# Patient Record
Sex: FEMALE | Race: AMERICAN INDIAN OR ALASKA NATIVE
[De-identification: names, ages, dates, MRNs, and addresses within clinical notes are randomized per-mention and may not be internally consistent; named-entity substitution may affect disease eponyms.]

---

## 2019-02-07 ENCOUNTER — HOSPITAL ENCOUNTER (INPATIENT)
Dept: HOSPITAL 5 - TRG | Age: 29
LOS: 2 days | Discharge: HOME | End: 2019-02-09
Attending: OBSTETRICS & GYNECOLOGY | Admitting: OBSTETRICS & GYNECOLOGY
Payer: MEDICAID

## 2019-02-07 DIAGNOSIS — Z23: ICD-10-CM

## 2019-02-07 DIAGNOSIS — Z79.899: ICD-10-CM

## 2019-02-07 DIAGNOSIS — Z3A.40: ICD-10-CM

## 2019-02-07 DIAGNOSIS — Z91.013: ICD-10-CM

## 2019-02-07 LAB
BASOPHILS # (AUTO): 0 K/MM3 (ref 0–0.1)
BASOPHILS NFR BLD AUTO: 0.2 % (ref 0–1.8)
EOSINOPHIL # BLD AUTO: 0 K/MM3 (ref 0–0.4)
EOSINOPHIL NFR BLD AUTO: 0.1 % (ref 0–4.3)
HCT VFR BLD CALC: 39.2 % (ref 30.3–42.9)
HGB BLD-MCNC: 13.1 GM/DL (ref 10.1–14.3)
LYMPHOCYTES # BLD AUTO: 1.5 K/MM3 (ref 1.2–5.4)
LYMPHOCYTES NFR BLD AUTO: 14.8 % (ref 13.4–35)
MCHC RBC AUTO-ENTMCNC: 33 % (ref 30–34)
MCV RBC AUTO: 109 FL (ref 79–97)
MONOCYTES # (AUTO): 0.5 K/MM3 (ref 0–0.8)
MONOCYTES % (AUTO): 4.6 % (ref 0–7.3)
PLATELET # BLD: 105 K/MM3 (ref 140–440)
RBC # BLD AUTO: 3.6 M/MM3 (ref 3.65–5.03)

## 2019-02-07 PROCEDURE — 85014 HEMATOCRIT: CPT

## 2019-02-07 PROCEDURE — 85018 HEMOGLOBIN: CPT

## 2019-02-07 PROCEDURE — 86901 BLOOD TYPING SEROLOGIC RH(D): CPT

## 2019-02-07 PROCEDURE — 59025 FETAL NON-STRESS TEST: CPT

## 2019-02-07 PROCEDURE — 86900 BLOOD TYPING SEROLOGIC ABO: CPT

## 2019-02-07 PROCEDURE — 36415 COLL VENOUS BLD VENIPUNCTURE: CPT

## 2019-02-07 PROCEDURE — 85025 COMPLETE CBC W/AUTO DIFF WBC: CPT

## 2019-02-07 PROCEDURE — 86592 SYPHILIS TEST NON-TREP QUAL: CPT

## 2019-02-07 PROCEDURE — 86850 RBC ANTIBODY SCREEN: CPT

## 2019-02-07 PROCEDURE — 90707 MMR VACCINE SC: CPT

## 2019-02-07 PROCEDURE — 85461 HEMOGLOBIN FETAL: CPT

## 2019-02-07 RX ADMIN — SODIUM CHLORIDE, SODIUM LACTATE, POTASSIUM CHLORIDE, AND CALCIUM CHLORIDE SCH MLS/HR: .6; .31; .03; .02 INJECTION, SOLUTION INTRAVENOUS at 20:15

## 2019-02-07 RX ADMIN — FENTANYL CITRATE SCH MLS/HR: 50 INJECTION, SOLUTION INTRAMUSCULAR; INTRAVENOUS at 21:29

## 2019-02-08 LAB
HCT VFR BLD CALC: 31.6 % (ref 30.3–42.9)
HGB BLD-MCNC: 10.4 GM/DL (ref 10.1–14.3)

## 2019-02-08 PROCEDURE — 3E0334Z INTRODUCTION OF SERUM, TOXOID AND VACCINE INTO PERIPHERAL VEIN, PERCUTANEOUS APPROACH: ICD-10-PCS | Performed by: OBSTETRICS & GYNECOLOGY

## 2019-02-08 PROCEDURE — 0KQM0ZZ REPAIR PERINEUM MUSCLE, OPEN APPROACH: ICD-10-PCS | Performed by: OBSTETRICS & GYNECOLOGY

## 2019-02-08 RX ADMIN — FENTANYL CITRATE SCH MLS/HR: 50 INJECTION, SOLUTION INTRAMUSCULAR; INTRAVENOUS at 04:40

## 2019-02-08 RX ADMIN — SODIUM CHLORIDE, SODIUM LACTATE, POTASSIUM CHLORIDE, AND CALCIUM CHLORIDE SCH MLS/HR: .6; .31; .03; .02 INJECTION, SOLUTION INTRAVENOUS at 01:14

## 2019-02-08 RX ADMIN — Medication SCH EACH: at 12:10

## 2019-02-08 RX ADMIN — IBUPROFEN SCH MG: 600 TABLET, FILM COATED ORAL at 17:39

## 2019-02-08 RX ADMIN — FERROUS SULFATE TAB 325 MG (65 MG ELEMENTAL FE) SCH MG: 325 (65 FE) TAB at 12:10

## 2019-02-08 RX ADMIN — FERROUS SULFATE TAB 325 MG (65 MG ELEMENTAL FE) SCH MG: 325 (65 FE) TAB at 22:12

## 2019-02-08 RX ADMIN — IBUPROFEN SCH MG: 600 TABLET, FILM COATED ORAL at 12:11

## 2019-02-08 RX ADMIN — DOCUSATE SODIUM SCH MG: 100 CAPSULE, LIQUID FILLED ORAL at 22:12

## 2019-02-08 NOTE — HISTORY AND PHYSICAL REPORT
History of Present Illness


Date of examination: 19


Date of admission: 


19 19:03





Chief complaint: 





contractions


History of present illness: 





This is a 29 yo  at 40+3 weeks here for contractions. She was evaluated 

in triage and noted to be 7cm. She is apatient of [facility] with late presentation

to care at 16 weeks. She is rh neg and received rhogam 18. She has a hx of

std in this pregnancy including chlamydia treated and mary neg. Also trich tr

eated in September and neg in October. She is GBS neg. 





Past History


Past Medical History: heart disease (heart mumur )


Past Surgical History: no surgical history


GYN History: chlamydia, trichomonas


Social history: single.  denies: smoking, alcohol abuse, prescription drug abuse





- Obstetrical History


Expected Date of Delivery: 19


Actual Gestation: 40 Week(s) 3 Day(s) 


: 2


Para: 0


Hx # Term Pregnancies: 0


Number of  Pregnancies: 0


Spontaneous Abortions: 0


Induced : 1


Number of Living Children: 0





Medications and Allergies


                                    Allergies











Allergy/AdvReac Type Severity Reaction Status Date / Time


 


SEAFOOD Allergy Intermediate Swelling Uncoded 19 19:12











                                Home Medications











 Medication  Instructions  Recorded  Confirmed  Last Taken  Type


 


Prenatal Vit No.129/Iron/Folic 1 each PO DAILY 19 09:00 

History





[Prenatal Tablet]    1 











Active Meds: 


Active Medications





Butorphanol Tartrate (Stadol)  2 mg IV Q2H PRN


   PRN Reason: Labor Pain


Ephedrine Sulfate (Ephedrine Sulfate)  10 mg IV Q2M PRN


   PRN Reason: Hypotension


Lactated Ringer's (Lactated Ringers)  1,000 mls @ 125 mls/hr IV AS DIRECT MATTHEW


   Last Admin: 19 20:15 Dose:  125 mls/hr


   Documented by: 


Fentanyl/Bupivacaine/Sodium Chlor (Fentanyl-Bupiv 2 Mcg/Ml-0.125%)  200 mcg in 

100 mls @ 12 mls/hr EPIDURAL TITR MATTHEW; Protocol


   Last Admin: 19 21:29 Dose:  12 mls/hr


   Documented by: 


Naloxone HCl (Narcan 2 Mg/2 Ml)  0.2 mg IV Q5M PRN


   PRN Reason: Respiratory sedation











Review of Systems


All systems: negative





- Vital Signs


Vital signs: 


                                   Vital Signs











Pulse BP


 


 76   129/73 


 


 19 18:34  19 18:34








                                        











Temp Pulse Resp BP Pulse Ox


 


 98.6 F   146 H  18   122/64   96 


 


 19 19:00  19 00:43  19 21:34  19 00:24  19 00:43














- Physical Exam


Breasts: Positive: normal


Cardiovascular: Regular rate, Normal S1


Lungs: Positive: Clear to auscultation, Normal air movement


Abdomen: Positive: normal appearance, soft, normal bowel sounds.  Negative: 

distention, tenderness, guarding


Genitourinary (Female): Positive: normal external genitalia, normal perenium


Vagina: Positive: normal moisture


Uterus: Positive: normal size, normal contour


Anus/Rectum: Positive: normal perianal skin


Extremities: Positive: normal


Deep Tendon Reflex Grade: Normal +2





- Obstetrical


FHR: category 1


Cervical Dilatation: 10


Cervical Effacement Percentage: 100


Fetal station: -2


Uterine Contraction Pattern: Regular


Uterine Tone Measurement Phase: Contraction


Uterine Contraction Intensity: Strong/Firm





Results


Result Diagrams: 


                                 19 16:50





                              Abnormal lab results











  19 Range/Units





  16:50 


 


RBC  3.60 L  (3.65-5.03)  M/mm3


 


MCV  109 H  (79-97)  fl


 


MCH  36 H  (28-32)  pg


 


Plt Count  105 L  (140-440)  K/mm3


 


Seg Neutrophils %  80.3 H  (40.0-70.0)  %


 


Seg Neutrophils #  8.1 H  (1.8-7.7)  K/mm3








All other labs normal.








Assessment and Plan





A/P 


IUP 40+3 weeks


GBS neg


Active labor


s/p epidural 


start pitocin for augmentation


expect vaginal delivery

## 2019-02-08 NOTE — PROCEDURE NOTE
OB Delivery Note





- Delivery


Date of Delivery: 19


Surgeon: HANS HILTON


Estimated blood loss: other (450cc)





- Vaginal


Delivery presentation: vertex


Delivery position: OP


Delivery augmentation: rupture of membranes


Delivery monitor: external FHT, external uterine


Route of delivery: 


Delivery placenta: spontaneous


Delivery cord: 3 umbilical vessels


Episiotomy: none


Delivery laceration: 2nd degree


Delivery repair: vicryl


Delivery comments: 





Patient was noted to be pushing and  in OP presentation and delivered a 

male infant with head and shoulders delivered easily at 0516. The baby placed on

mom abdomen and suctioned nasopharynx and oropharynx while on mom chest. The 

cord was allowed to pulsate for 1 min. Apgars noted to be 8 and 9. The cord was 

then clamped and cut. The placenta delivered  intact 3 vessel cord. weight of 

the baby is 7 pounds 7 oz. EBL 450cc. Survey of perineum noted a 2nd degree 

repairedd with 2-0 vicryl in normal fashion. Patinient tolerated and bonded with

baby 





- Infant


  ** A


Apgar at 1 minute: 8


Apgar at 5 minutes: 9


Infant Gender: Male (7 pounds 7 oz)

## 2019-02-09 VITALS — DIASTOLIC BLOOD PRESSURE: 68 MMHG | SYSTOLIC BLOOD PRESSURE: 107 MMHG

## 2019-02-09 PROCEDURE — 3E0234Z INTRODUCTION OF SERUM, TOXOID AND VACCINE INTO MUSCLE, PERCUTANEOUS APPROACH: ICD-10-PCS | Performed by: OBSTETRICS & GYNECOLOGY

## 2019-02-09 RX ADMIN — DOCUSATE SODIUM SCH MG: 100 CAPSULE, LIQUID FILLED ORAL at 10:44

## 2019-02-09 RX ADMIN — IBUPROFEN SCH MG: 600 TABLET, FILM COATED ORAL at 13:17

## 2019-02-09 RX ADMIN — Medication SCH EACH: at 10:44

## 2019-02-09 RX ADMIN — FERROUS SULFATE TAB 325 MG (65 MG ELEMENTAL FE) SCH MG: 325 (65 FE) TAB at 10:44

## 2019-02-09 RX ADMIN — IBUPROFEN SCH: 600 TABLET, FILM COATED ORAL at 00:00

## 2019-02-09 NOTE — DISCHARGE SUMMARY
Providers





- Providers


Date of Admission: 


19 19:03





Date of discharge: 19


Attending physician: 


HANS HILTON MD





Primary care physician: 


TSERING CARROLL








Hospitalization


Reason for admission: active labor


Delivery: 


Discharge diagnosis: IUP at term delivered


Hospital course: 


Patient admitted in active labor. Had a . Postpartum uneventful





Condition at discharge: Good


Disposition: DC-01 TO HOME OR SELFCARE





- Discharge Diagnoses


(1) Active labor at term


Status: Acute   





Plan





- Discharge Medications


Prescriptions: 


Ibuprofen [Motrin] 600 mg PO Q8H PRN #30 tablet


 PRN Reason: Pain


oxyCODONE /ACETAMINOPHEN [Percocet 5/325] 1 tab PO Q6HR PRN #30 tablet


 PRN Reason: Pain





- Provider Discharge Summary


Activity: no sex for 6 weeks, no heavy lifting 4 weeks, no strenuous exercise


Diet: routine


Instructions: routine


Additional instructions: 


[]  Smoking cessation referral if applicable(refer to patient education folder 

for contact #)


[]  Refer to [facility]'s [facility] Booklet








Call your doctor immediately for:


* Fever > 100.5


* Heavy vaginal bleeding ( >1 pad per hour)


* Severe persistent headache


* Shortness of breath


* Reddened, hot, painful area to leg or breast


* schedule followup in 4 weeks








- Follow up plan

## 2019-02-09 NOTE — PROGRESS NOTE
Assessment and Plan





- Patient Problems


(1) Active labor at term


Current Visit: Yes   Status: Acute   


Plan to address problem: 


patient doing well 


discharge home








Subjective





- Subjective


Date of service: 19


Interval history: 


Patient doing well without complaints. Pain well controlled





Patient reports: appetite normal, voiding normally, pain well controlled


: doing well





Objective





- Vital Signs


Latest vital signs: 


                                   Vital Signs











  Temp Pulse Resp BP BP Pulse Ox


 


 19 08:19  97.7 F  78  20  108/69   100


 


 19 00:25  98.3 F   18  113/59  


 


 19 20:02  97.3 F L   18  94/47  


 


 19 20:00      110/60 


 


 19 17:06  97.5 F L   18  110/62  








                                Intake and Output











 19





 22:59 06:59 14:59


 


Intake Total  480 


 


Balance  480 


 


Intake:   


 


  Oral  480 


 


Other:   


 


  Total, Intake Amount  480 


 


  # Voids   


 


    Void  1 














- Exam


Breasts: Present: deferred


Cardiovascular: Present: Regular rate


Abdomen: Present: normal appearance

## 2022-05-30 ENCOUNTER — HOSPITAL ENCOUNTER (EMERGENCY)
Dept: HOSPITAL 5 - ED | Age: 32
LOS: 1 days | Discharge: HOME | End: 2022-05-31
Payer: MEDICAID

## 2022-05-30 DIAGNOSIS — M23.92: Primary | ICD-10-CM

## 2022-05-30 PROCEDURE — 81025 URINE PREGNANCY TEST: CPT

## 2022-05-30 PROCEDURE — 99283 EMERGENCY DEPT VISIT LOW MDM: CPT

## 2022-05-31 VITALS — SYSTOLIC BLOOD PRESSURE: 111 MMHG | DIASTOLIC BLOOD PRESSURE: 63 MMHG

## 2022-05-31 NOTE — EMERGENCY DEPARTMENT REPORT
ED Lower Extremity HPI





- General


Chief Complaint: Fall


Stated Complaint: FALL INJURY


Time Seen by Provider: 05/31/22 03:14


Source: patient


Mode of arrival: Ambulatory


Limitations: No Limitations





- History of Present Illness


MD Complaint: knee injury


-: Sudden


Injury: Knee: Left





- Related Data


                                Home Medications











 Medication  Instructions  Recorded  Confirmed  Last Taken


 


Prenatal Vit No.129/Iron/Folic 1 each PO DAILY 02/07/19 02/07/19 02/07/19 09:00





[Prenatal Tablet]    1








                                  Previous Rx's











 Medication  Instructions  Recorded  Last Taken  Type


 


Ibuprofen [Motrin] 600 mg PO Q8H PRN #30 tablet 02/08/19 Unknown Rx


 


oxyCODONE /ACETAMINOPHEN [Percocet 1 tab PO Q6HR PRN #30 tablet 02/08/19 Unknown

 Rx





5/325]    


 


traMADoL [Ultram] 50 mg PO Q6HR PRN #20 tablet 05/31/22 Unknown Rx











                                    Allergies











Allergy/AdvReac Type Severity Reaction Status Date / Time


 


SEAFOOD Allergy Intermediate Swelling Uncoded 02/07/19 19:12














ED Review of Systems


ROS: 


Stated complaint: FALL INJURY


Other details as noted in HPI





Comment: All other systems reviewed and negative





ED Past Medical Hx





- Past Medical History


Hx Hypertension: No


Hx Congestive Heart Failure: No


Hx Diabetes: No


Hx Deep Vein Thrombosis: No


Hx Renal Disease: No


Hx Sickle Cell Disease: No


Hx Seizures: No


Hx Asthma: No


Hx COPD: No


Hx HIV: No





- Social History


Smoking Status: Never Smoker





- Medications


Home Medications: 


                                Home Medications











 Medication  Instructions  Recorded  Confirmed  Last Taken  Type


 


Prenatal Vit No.129/Iron/Folic 1 each PO DAILY 02/07/19 02/07/19 02/07/19 09:00 

History





[Prenatal Tablet]    1 


 


Ibuprofen [Motrin] 600 mg PO Q8H PRN #30 tablet 02/08/19  Unknown Rx


 


oxyCODONE /ACETAMINOPHEN [Percocet 1 tab PO Q6HR PRN #30 tablet 02/08/19  

Unknown Rx





5/325]     


 


traMADoL [Ultram] 50 mg PO Q6HR PRN #20 tablet 05/31/22  Unknown Rx














ED Physical Exam





- General


Limitations: No Limitations


General appearance: alert, in no apparent distress





- Head


Head exam: Present: atraumatic, normocephalic





- Eye


Eye exam: Present: normal appearance





- ENT


ENT exam: Present: mucous membranes moist





- Neck


Neck exam: Present: normal inspection





- Respiratory


Respiratory exam: Present: normal lung sounds bilaterally.  Absent: respiratory 

distress





- Cardiovascular


Cardiovascular Exam: Present: regular rate, normal rhythm.  Absent: systolic 

murmur, diastolic murmur, rubs, gallop





- GI/Abdominal


GI/Abdominal exam: Present: soft, normal bowel sounds





- Extremities Exam


Extremities exam: Present: normal inspection





- Back Exam


Back exam: Present: normal inspection





- Neurological Exam


Neurological exam: Present: alert, oriented X3





- Psychiatric


Psychiatric exam: Present: normal affect, normal mood





- Skin


Skin exam: Present: warm, dry, intact, normal color.  Absent: rash





ED Course





                                   Vital Signs











  05/30/22





  23:11


 


Temperature 99.9 F H


 


Pulse Rate 70


 


Respiratory 18





Rate 


 


Blood Pressure 103/55


 


O2 Sat by Pulse 100





Oximetry 











Critical care attestation.: 


If time is entered above; I have spent that time in minutes in the direct care 

of this critically ill patient, excluding procedure time.








ED Disposition


Clinical Impression: 


 Knee internal derangement





Disposition: 01 HOME / SELF CARE / HOMELESS


Is pt being admited?: No


Does the pt Need Aspirin: No


Condition: Stable


Instructions:  Acute Knee Pain, Adult, How to Use a Knee Immobilizer, Meniscus 

Tear, Medial Collateral Knee Ligament Sprain, Phase II Rehab-SportsMed, Knee 

Sprain, Adult, Easy-to-Read


Prescriptions: 


traMADoL [Ultram] 50 mg PO Q6HR PRN #20 tablet


 PRN Reason: Pain


Referrals: 


FAITH GIMENEZ MD [Primary Care Provider] - 3-5 Days


[facility] [Provider Group] - 3-5 Days

## 2022-05-31 NOTE — XRAY REPORT
LEFT KNEE 3 VIEW(S)



INDICATION / CLINICAL INFORMATION: INJURY



COMPARISON: None available.

 

FINDINGS:



BONES / JOINT(S): No acute fracture or subluxation. No significant arthritis.



SOFT TISSUES: No significant abnormality.



ADDITIONAL FINDINGS: None.



IMPRESSION:

1.No evidence of acute osseous pathology.



Signer Name: Tomas Anderson II, MD 

Signed: 5/31/2022 3:12 AM

Workstation Name: [id number]